# Patient Record
Sex: FEMALE | Race: WHITE
[De-identification: names, ages, dates, MRNs, and addresses within clinical notes are randomized per-mention and may not be internally consistent; named-entity substitution may affect disease eponyms.]

---

## 2021-08-06 ENCOUNTER — HOSPITAL ENCOUNTER (OUTPATIENT)
Dept: HOSPITAL 47 - ORWHC2ENDO | Age: 56
Discharge: HOME | End: 2021-08-06
Attending: SURGERY
Payer: COMMERCIAL

## 2021-08-06 VITALS — HEART RATE: 83 BPM | DIASTOLIC BLOOD PRESSURE: 93 MMHG | SYSTOLIC BLOOD PRESSURE: 155 MMHG

## 2021-08-06 VITALS — TEMPERATURE: 98.7 F | RESPIRATION RATE: 16 BRPM

## 2021-08-06 VITALS — BODY MASS INDEX: 50.3 KG/M2

## 2021-08-06 DIAGNOSIS — G43.909: ICD-10-CM

## 2021-08-06 DIAGNOSIS — Z12.11: Primary | ICD-10-CM

## 2021-08-06 DIAGNOSIS — F41.9: ICD-10-CM

## 2021-08-06 DIAGNOSIS — Z88.0: ICD-10-CM

## 2021-08-06 DIAGNOSIS — Z88.6: ICD-10-CM

## 2021-08-06 DIAGNOSIS — Z88.5: ICD-10-CM

## 2021-08-06 DIAGNOSIS — Z85.828: ICD-10-CM

## 2021-08-06 PROCEDURE — 81025 URINE PREGNANCY TEST: CPT

## 2021-08-06 NOTE — P.OP
Date of Procedure: 08/06/21


Preoperative Diagnosis: 


Screening colonoscopy


Postoperative Diagnosis: 


Normal colon


Procedure(s) Performed: 


Colonoscopy


Anesthesia: NAVARRO


Surgeon: John Patton


Pathology: none sent


Condition: stable


Disposition: PACU


Description of Procedure: 


N


PROCEDURE:  The patient was placed on the endoscopy table in the lateral 

position.  Digital rectal examination was performed which revealed no 

abnormalities.  .  Flexible colonoscope was then placed in the patient's anus 

and passed throughout the entire colon.  The ileocecal valve was visualized.  

The cecum, ascending, transverse, descending and sigmoid colon were normal.  The

rectum was normal as well.  There were no masses, polyps or diverticula noted in

the entire colon. 





SUMMARY OF FINDINGS:  


Normal colonoscopy.
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
## 2021-11-13 ENCOUNTER — HOSPITAL ENCOUNTER (OUTPATIENT)
Dept: HOSPITAL 47 - LABPAT | Age: 56
Discharge: HOME | End: 2021-11-13
Payer: COMMERCIAL

## 2021-11-13 DIAGNOSIS — Z01.812: Primary | ICD-10-CM

## 2021-11-13 DIAGNOSIS — K43.2: ICD-10-CM

## 2021-11-13 LAB
ERYTHROCYTE [DISTWIDTH] IN BLOOD BY AUTOMATED COUNT: 4.3 M/UL (ref 3.8–5.4)
ERYTHROCYTE [DISTWIDTH] IN BLOOD: 13.3 % (ref 11.5–15.5)
HCT VFR BLD AUTO: 41.5 % (ref 34–46)
HGB BLD-MCNC: 13 GM/DL (ref 11.4–16)
MCH RBC QN AUTO: 30.3 PG (ref 25–35)
MCHC RBC AUTO-ENTMCNC: 31.4 G/DL (ref 31–37)
MCV RBC AUTO: 96.5 FL (ref 80–100)
PLATELET # BLD AUTO: 207 K/UL (ref 150–450)
WBC # BLD AUTO: 5.3 K/UL (ref 3.8–10.6)

## 2021-11-13 PROCEDURE — 36415 COLL VENOUS BLD VENIPUNCTURE: CPT

## 2021-11-13 PROCEDURE — 85027 COMPLETE CBC AUTOMATED: CPT

## 2021-11-17 ENCOUNTER — HOSPITAL ENCOUNTER (OUTPATIENT)
Dept: HOSPITAL 47 - OR | Age: 56
Discharge: HOME | End: 2021-11-17
Attending: SURGERY
Payer: COMMERCIAL

## 2021-11-17 VITALS — DIASTOLIC BLOOD PRESSURE: 65 MMHG | SYSTOLIC BLOOD PRESSURE: 135 MMHG | HEART RATE: 82 BPM

## 2021-11-17 VITALS — RESPIRATION RATE: 16 BRPM

## 2021-11-17 VITALS — TEMPERATURE: 98.7 F

## 2021-11-17 VITALS — BODY MASS INDEX: 53 KG/M2

## 2021-11-17 DIAGNOSIS — K42.0: Primary | ICD-10-CM

## 2021-11-17 DIAGNOSIS — Z88.5: ICD-10-CM

## 2021-11-17 DIAGNOSIS — M47.816: ICD-10-CM

## 2021-11-17 DIAGNOSIS — Z85.828: ICD-10-CM

## 2021-11-17 DIAGNOSIS — M50.20: ICD-10-CM

## 2021-11-17 DIAGNOSIS — Z98.891: ICD-10-CM

## 2021-11-17 DIAGNOSIS — Z98.890: ICD-10-CM

## 2021-11-17 DIAGNOSIS — M43.22: ICD-10-CM

## 2021-11-17 DIAGNOSIS — Z88.6: ICD-10-CM

## 2021-11-17 DIAGNOSIS — Z88.0: ICD-10-CM

## 2021-11-17 DIAGNOSIS — Z88.8: ICD-10-CM

## 2021-11-17 DIAGNOSIS — G93.5: ICD-10-CM

## 2021-11-17 DIAGNOSIS — Z79.899: ICD-10-CM

## 2021-11-17 DIAGNOSIS — M54.30: ICD-10-CM

## 2021-11-17 LAB — GLUCOSE BLD-MCNC: 121 MG/DL (ref 75–99)

## 2021-11-17 PROCEDURE — 49653: CPT

## 2021-11-17 PROCEDURE — 81025 URINE PREGNANCY TEST: CPT

## 2021-11-17 PROCEDURE — 64999 UNLISTED PX NERVOUS SYSTEM: CPT

## 2021-11-17 RX ADMIN — FENTANYL CITRATE PRN MCG: 50 INJECTION, SOLUTION INTRAMUSCULAR; INTRAVENOUS at 10:41

## 2021-11-17 RX ADMIN — FENTANYL CITRATE PRN MCG: 50 INJECTION, SOLUTION INTRAMUSCULAR; INTRAVENOUS at 10:52
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
## 2021-11-17 NOTE — P.ANPRN
Procedure Note - Anesthesia





- Nerve Block Performed


  ** Bilateral Erector Spinae Single


Time Out Performed: Yes


Date of Procedure: 11/17/21


Location of Patient: Pre[facility]


Indication: Acute Post-Operative Pain, Dx/Pain Location (abdominal ), Requested 

by Surgeon


Sedation Type: Sedate with meaningful contact maintained


Preparation: Sterile Prep, Sterile Dressing


Position: Prone


Catheter: None


Needle Gauge: 20


Ultrasound used to visualize needle placement: Yes


Ultrasound used to observe medication spread: Yes


Injectate: 0.5% Ropivacaine (see comment for volume) (30 ml of 0.5% Ropivacaine 

mixed with 30 ml of 09% Nacl  - 30 ml of mixture injected on each side.)


Blood Aspirated: No


Pain Paresthesia on Injection Noted: No


Resistance on Injection: Normal


Image Stored and Saved: Yes


Events: Uneventful and Well Tolerated